# Patient Record
Sex: FEMALE | Race: WHITE | NOT HISPANIC OR LATINO | Employment: UNEMPLOYED | URBAN - METROPOLITAN AREA
[De-identification: names, ages, dates, MRNs, and addresses within clinical notes are randomized per-mention and may not be internally consistent; named-entity substitution may affect disease eponyms.]

---

## 2018-03-22 ENCOUNTER — HOSPITAL ENCOUNTER (EMERGENCY)
Facility: MEDICAL CENTER | Age: 23
End: 2018-03-23
Attending: EMERGENCY MEDICINE
Payer: COMMERCIAL

## 2018-03-22 ENCOUNTER — APPOINTMENT (OUTPATIENT)
Dept: RADIOLOGY | Facility: MEDICAL CENTER | Age: 23
End: 2018-03-22
Attending: EMERGENCY MEDICINE
Payer: COMMERCIAL

## 2018-03-22 VITALS
BODY MASS INDEX: 43.32 KG/M2 | OXYGEN SATURATION: 96 % | DIASTOLIC BLOOD PRESSURE: 81 MMHG | HEIGHT: 63 IN | SYSTOLIC BLOOD PRESSURE: 141 MMHG | HEART RATE: 90 BPM | TEMPERATURE: 98.7 F | RESPIRATION RATE: 18 BRPM | WEIGHT: 244.49 LBS

## 2018-03-22 DIAGNOSIS — N83.209 CYST OF OVARY, UNSPECIFIED LATERALITY: ICD-10-CM

## 2018-03-22 LAB
ALBUMIN SERPL BCP-MCNC: 3.9 G/DL (ref 3.2–4.9)
ALBUMIN/GLOB SERPL: 1 G/DL
ALP SERPL-CCNC: 64 U/L (ref 30–99)
ALT SERPL-CCNC: 27 U/L (ref 2–50)
ANION GAP SERPL CALC-SCNC: 8 MMOL/L (ref 0–11.9)
APPEARANCE UR: CLEAR
AST SERPL-CCNC: 53 U/L (ref 12–45)
BASOPHILS # BLD AUTO: 0.5 % (ref 0–1.8)
BASOPHILS # BLD: 0.06 K/UL (ref 0–0.12)
BILIRUB SERPL-MCNC: 0.3 MG/DL (ref 0.1–1.5)
BUN SERPL-MCNC: 11 MG/DL (ref 8–22)
CALCIUM SERPL-MCNC: 8.7 MG/DL (ref 8.4–10.2)
CHLORIDE SERPL-SCNC: 105 MMOL/L (ref 96–112)
CO2 SERPL-SCNC: 23 MMOL/L (ref 20–33)
COLOR UR: YELLOW
CREAT SERPL-MCNC: 0.65 MG/DL (ref 0.5–1.4)
EOSINOPHIL # BLD AUTO: 0.33 K/UL (ref 0–0.51)
EOSINOPHIL NFR BLD: 2.8 % (ref 0–6.9)
ERYTHROCYTE [DISTWIDTH] IN BLOOD BY AUTOMATED COUNT: 38.9 FL (ref 35.9–50)
GLOBULIN SER CALC-MCNC: 3.8 G/DL (ref 1.9–3.5)
GLUCOSE SERPL-MCNC: 83 MG/DL (ref 65–99)
GLUCOSE UR STRIP.AUTO-MCNC: NEGATIVE MG/DL
HCG UR QL: NEGATIVE
HCT VFR BLD AUTO: 41.6 % (ref 37–47)
HGB BLD-MCNC: 13.8 G/DL (ref 12–16)
IMM GRANULOCYTES # BLD AUTO: 0.04 K/UL (ref 0–0.11)
IMM GRANULOCYTES NFR BLD AUTO: 0.3 % (ref 0–0.9)
KETONES UR STRIP.AUTO-MCNC: NEGATIVE MG/DL
LEUKOCYTE ESTERASE UR QL STRIP.AUTO: NEGATIVE
LIPASE SERPL-CCNC: 17 U/L (ref 7–58)
LYMPHOCYTES # BLD AUTO: 3.8 K/UL (ref 1–4.8)
LYMPHOCYTES NFR BLD: 32 % (ref 22–41)
MCH RBC QN AUTO: 28.1 PG (ref 27–33)
MCHC RBC AUTO-ENTMCNC: 33.2 G/DL (ref 33.6–35)
MCV RBC AUTO: 84.7 FL (ref 81.4–97.8)
MONOCYTES # BLD AUTO: 0.65 K/UL (ref 0–0.85)
MONOCYTES NFR BLD AUTO: 5.5 % (ref 0–13.4)
NEUTROPHILS # BLD AUTO: 7 K/UL (ref 2–7.15)
NEUTROPHILS NFR BLD: 58.9 % (ref 44–72)
NITRITE UR QL STRIP.AUTO: NEGATIVE
NRBC # BLD AUTO: 0 K/UL
NRBC BLD-RTO: 0 /100 WBC
PH UR STRIP.AUTO: 5 [PH]
PLATELET # BLD AUTO: 327 K/UL (ref 164–446)
PMV BLD AUTO: 10.1 FL (ref 9–12.9)
POTASSIUM SERPL-SCNC: 3.6 MMOL/L (ref 3.6–5.5)
PROT SERPL-MCNC: 7.7 G/DL (ref 6–8.2)
PROT UR QL STRIP: ABNORMAL MG/DL
RBC # BLD AUTO: 4.91 M/UL (ref 4.2–5.4)
RBC UR QL AUTO: ABNORMAL
SODIUM SERPL-SCNC: 136 MMOL/L (ref 135–145)
SP GR UR STRIP.AUTO: >=1.03
WBC # BLD AUTO: 11.9 K/UL (ref 4.8–10.8)

## 2018-03-22 PROCEDURE — 81002 URINALYSIS NONAUTO W/O SCOPE: CPT

## 2018-03-22 PROCEDURE — 81025 URINE PREGNANCY TEST: CPT

## 2018-03-22 PROCEDURE — 83690 ASSAY OF LIPASE: CPT

## 2018-03-22 PROCEDURE — 85025 COMPLETE CBC W/AUTO DIFF WBC: CPT

## 2018-03-22 PROCEDURE — 99284 EMERGENCY DEPT VISIT MOD MDM: CPT

## 2018-03-22 PROCEDURE — 80053 COMPREHEN METABOLIC PANEL: CPT

## 2018-03-22 PROCEDURE — 700117 HCHG RX CONTRAST REV CODE 255: Performed by: EMERGENCY MEDICINE

## 2018-03-22 PROCEDURE — 74177 CT ABD & PELVIS W/CONTRAST: CPT

## 2018-03-22 RX ORDER — HYDROCODONE BITARTRATE AND ACETAMINOPHEN 5; 325 MG/1; MG/1
1-2 TABLET ORAL EVERY 4 HOURS PRN
Qty: 10 TAB | Refills: 0 | Status: SHIPPED | OUTPATIENT
Start: 2018-03-22 | End: 2018-03-25

## 2018-03-22 RX ADMIN — IOHEXOL 100 ML: 350 INJECTION, SOLUTION INTRAVENOUS at 22:48

## 2018-03-22 ASSESSMENT — PAIN SCALES - GENERAL: PAINLEVEL_OUTOF10: 6

## 2018-03-23 NOTE — ED NOTES
"Pt approaching triage RN stating \"I have blood in my stool now. There was blood when I wiped.\"   "

## 2018-03-23 NOTE — ED NOTES
Chief Complaint   Patient presents with   • Abdominal Pain     that pain has been there for over a year but the last 2 days she experienced a sharp pain while doing pull downs.

## 2018-03-23 NOTE — ED PROVIDER NOTES
"ED Provider Note    CHIEF COMPLAINT  Chief Complaint   Patient presents with   • Abdominal Pain     that pain has been there for over a year but the last 2 days she experienced a sharp pain while doing pull downs.       HPI  Tenzin Guillen is a 22 y.o. female who presents with complaints of abdominal pain mostly left lower quadrant. It's been there for a year increase the last couple of days she was doing some sort of exercise at the gym. This sharper pain in the left lower quadrant that has persisted although less severe than it was 2 days ago. She had some apparent rectal bleeding tonight although this apparently turned out to be the start of her menstrual period    REVIEW OF SYSTEMS  See HPI for further details. GI she's had some intermittent diarrhea All other systems are negative.    PAST MEDICAL HISTORY  No past medical history on file.    FAMILY HISTORY  No family history on file.    SOCIAL HISTORY  Social History     Social History   • Marital status: Single     Spouse name: N/A   • Number of children: N/A   • Years of education: N/A     Social History Main Topics   • Smoking status: Not on file   • Smokeless tobacco: Not on file   • Alcohol use Not on file   • Drug use: Unknown   • Sexual activity: Not on file     Other Topics Concern   • Not on file     Social History Narrative   • No narrative on file       SURGICAL HISTORY  No past surgical history on file.    CURRENT MEDICATIONS  Home Medications    **Home medications have not yet been reviewed for this encounter**         ALLERGIES  Not on File    PHYSICAL EXAM  VITAL SIGNS: /81   Pulse 81   Temp 37.1 °C (98.7 °F)   Resp 18   Ht 1.6 m (5' 3\")   Wt 110.9 kg (244 lb 7.8 oz)   SpO2 96%   BMI 43.31 kg/m²     Constitutional: Well developed, Well nourished, No acute distress, Non-toxic appearance.   Psychiatric:  Normal psychiatric exam, Normal affect, Normal judgement, Normal mood,   .able to give a good history.  HENT: Normocephalic, " Atraumatic, Bilateral external ears normal, mucous membranes moist, No oral exudates, Nose normal.      Neck: Normal range of motion, No tenderness, Supple, No stridor.   Lymphatic: No cervical or axillary lymphadenopathy noted.   Cardiovascular: Normal heart rate, Normal rhythm, No murmurs,  , No gallops.   Thorax & Lungs: Normal breath sounds, No respiratory distress, No wheezing, or other abnormal breath sounds. No chest tenderness.   Abdomen: Bowel sounds normal, Soft, left lower quadrant mild tenderness, No masses, No pulsatile masses. No guarding.  Rectal: No hemorrhoids seen Hemoccult negative  Skin: Warm, Dry, No erythema, No rash.   Back: No tenderness, No CVA tenderness.   Extremities: Intact distal pulses, No edema, No tenderness, No cyanosis, No clubbing.   Musculoskeletal: Good range of motion in all major joints. No tenderness to palpation or major deformities, or injuries noted.   Neurologic: Alert & oriented x 3, Normal motor function, Normal sensory function, No focal deficits noted.        RADIOLOGY/PROCEDURES  CT-ABDOMEN-PELVIS WITH   Final Result      Pelvic findings as discussed above. Possibilities would include partially ruptured ovarian cyst or tubo-ovarian abscess. No other significant abnormalities.        Results for orders placed or performed during the hospital encounter of 03/22/18   CBC WITH DIFFERENTIAL   Result Value Ref Range    WBC 11.9 (H) 4.8 - 10.8 K/uL    RBC 4.91 4.20 - 5.40 M/uL    Hemoglobin 13.8 12.0 - 16.0 g/dL    Hematocrit 41.6 37.0 - 47.0 %    MCV 84.7 81.4 - 97.8 fL    MCH 28.1 27.0 - 33.0 pg    MCHC 33.2 (L) 33.6 - 35.0 g/dL    RDW 38.9 35.9 - 50.0 fL    Platelet Count 327 164 - 446 K/uL    MPV 10.1 9.0 - 12.9 fL    Neutrophils-Polys 58.90 44.00 - 72.00 %    Lymphocytes 32.00 22.00 - 41.00 %    Monocytes 5.50 0.00 - 13.40 %    Eosinophils 2.80 0.00 - 6.90 %    Basophils 0.50 0.00 - 1.80 %    Immature Granulocytes 0.30 0.00 - 0.90 %    Nucleated RBC 0.00 /100 WBC     Neutrophils (Absolute) 7.00 2.00 - 7.15 K/uL    Lymphs (Absolute) 3.80 1.00 - 4.80 K/uL    Monos (Absolute) 0.65 0.00 - 0.85 K/uL    Eos (Absolute) 0.33 0.00 - 0.51 K/uL    Baso (Absolute) 0.06 0.00 - 0.12 K/uL    Immature Granulocytes (abs) 0.04 0.00 - 0.11 K/uL    NRBC (Absolute) 0.00 K/uL   COMP METABOLIC PANEL   Result Value Ref Range    Sodium 136 135 - 145 mmol/L    Potassium 3.6 3.6 - 5.5 mmol/L    Chloride 105 96 - 112 mmol/L    Co2 23 20 - 33 mmol/L    Anion Gap 8.0 0.0 - 11.9    Glucose 83 65 - 99 mg/dL    Bun 11 8 - 22 mg/dL    Creatinine 0.65 0.50 - 1.40 mg/dL    Calcium 8.7 8.4 - 10.2 mg/dL    AST(SGOT) 53 (H) 12 - 45 U/L    ALT(SGPT) 27 2 - 50 U/L    Alkaline Phosphatase 64 30 - 99 U/L    Total Bilirubin 0.3 0.1 - 1.5 mg/dL    Albumin 3.9 3.2 - 4.9 g/dL    Total Protein 7.7 6.0 - 8.2 g/dL    Globulin 3.8 (H) 1.9 - 3.5 g/dL    A-G Ratio 1.0 g/dL   LIPASE   Result Value Ref Range    Lipase 17 7 - 58 U/L   ESTIMATED GFR   Result Value Ref Range    GFR If African American >60 >60 mL/min/1.73 m 2    GFR If Non African American >60 >60 mL/min/1.73 m 2   POC UA   Result Value Ref Range    POC Color Yellow     POC Appearance Clear     POC Glucose Negative Negative mg/dL    POC Ketones Negative Negative mg/dL    POC Specific Gravity >=1.030 (A) 1.005 - 1.030    POC Blood Large (A) Negative    POC Urine PH 5.0 5.0 - 8.0    POC Protein Trace (A) Negative mg/dL    POC Nitrites Negative Negative    POC Leukocyte Esterase Negative Negative   POC URINE PREGNANCY   Result Value Ref Range    POC Urine Pregnancy Test Negative        COURSE & MEDICAL DECISION MAKING  Pertinent Labs & Imaging studies reviewed. (See chart for details)  Patient has abdominal pain which seems to be secondary to ovarian cyst. She's not pregnant no other definite abnormalities seen, would not seem to require further evaluation this time examination is fairly benign so we referred for follow-up to Ob[facility]yn prescription pain medication for for  couple of days after reviewing the  report. She was counseled about opioids    FINAL IMPRESSION  1 ovarian cyst  2.   3.       Electronically signed by: Alonso Vidales, 3/22/2018 9:18 PM

## 2018-03-23 NOTE — ED NOTES
Rectal exam performed on pt by ERP with female chaperone at the bedside. Privacy provided during procedure. Pt tolerated exam well. Warm blanket provided.

## 2018-03-23 NOTE — DISCHARGE INSTRUCTIONS
Ovarian Cyst  An ovarian cyst is a fluid-filled sac on an ovary. The ovaries are organs that make eggs in women. Most ovarian cysts go away on their own and are not cancerous (are benign). Some cysts need treatment.  Follow these instructions at home:  · Take over-the-counter and prescription medicines only as told by your doctor.  · Do not drive or use heavy machinery while taking prescription pain medicine.  · Get pelvic exams and Pap tests as often as told by your doctor.  · Return to your normal activities as told by your doctor. Ask your doctor what activities are safe for you.  · Do not use any products that contain nicotine or tobacco, such as cigarettes and e-cigarettes. If you need help quitting, ask your doctor.  · Keep all follow-up visits as told by your doctor. This is important.  Contact a doctor if:  · Your periods are:  ¨ Late.  ¨ Irregular.  ¨ Painful.  · Your periods stop.  · You have pelvic pain that does not go away.  · You have pressure on your bladder.  · You have trouble making your bladder empty when you pee (urinate).  · You have pain during sex.  · You have any of the following in your belly (abdomen):  ¨ A feeling of fullness.  ¨ Pressure.  ¨ Discomfort.  ¨ Pain that does not go away.  ¨ Swelling.  · You feel sick most of the time.  · You have trouble pooping (have constipation).  · You are not as hungry as usual (you lose your appetite).  · You get very bad acne.  · You start to have more hair on your body and face.  · You are gaining weight or losing weight without changing your exercise and eating habits.  · You think you may be pregnant.  Get help right away if:  · You have belly pain that is very bad or gets worse.  · You cannot eat or drink without throwing up (vomiting).  · You suddenly get a fever.  · Your period is a lot heavier than usual.  This information is not intended to replace advice given to you by your health care provider. Make sure you discuss any questions you have  with your health care provider.  Document Released: 06/05/2009 Document Revised: 07/07/2017 Document Reviewed: 05/21/2017  [employer] Interactive Patient Education © 2017 [employer]

## 2018-07-09 ENCOUNTER — APPOINTMENT (OUTPATIENT)
Dept: RADIOLOGY | Facility: IMAGING CENTER | Age: 23
End: 2018-07-09
Attending: EMERGENCY MEDICINE
Payer: COMMERCIAL

## 2018-07-09 ENCOUNTER — OFFICE VISIT (OUTPATIENT)
Dept: URGENT CARE | Facility: CLINIC | Age: 23
End: 2018-07-09
Payer: COMMERCIAL

## 2018-07-09 VITALS
HEIGHT: 63 IN | SYSTOLIC BLOOD PRESSURE: 126 MMHG | BODY MASS INDEX: 43.23 KG/M2 | WEIGHT: 244 LBS | DIASTOLIC BLOOD PRESSURE: 86 MMHG | TEMPERATURE: 98.6 F | HEART RATE: 106 BPM

## 2018-07-09 DIAGNOSIS — S99.912A INJURY OF LEFT ANKLE, INITIAL ENCOUNTER: ICD-10-CM

## 2018-07-09 DIAGNOSIS — S93.402A SPRAIN OF LEFT ANKLE, UNSPECIFIED LIGAMENT, INITIAL ENCOUNTER: ICD-10-CM

## 2018-07-09 PROCEDURE — 73610 X-RAY EXAM OF ANKLE: CPT | Mod: 26,LT | Performed by: EMERGENCY MEDICINE

## 2018-07-09 PROCEDURE — 99202 OFFICE O/P NEW SF 15 MIN: CPT | Performed by: EMERGENCY MEDICINE

## 2018-07-09 ASSESSMENT — ENCOUNTER SYMPTOMS
FEVER: 0
TINGLING: 0
INABILITY TO BEAR WEIGHT: 1
SENSORY CHANGE: 0
FOCAL WEAKNESS: 0
LOSS OF SENSATION: 0
NUMBNESS: 0
LOSS OF MOTION: 1

## 2018-07-09 NOTE — LETTER
July 9, 2018       Patient: Tenzin Guillen   YOB: 1995   Date of Visit: 7/9/2018         To Whom It May Concern:    It is my medical opinion that Tenzin Guillen has work restriction 07/09--15/2018: Sitting activity only, must use a walking boot splint, crutches as needed.          Sincerely,          Jackson Funk M.D.  Electronically Signed

## 2018-07-10 NOTE — PROGRESS NOTES
"Subjective:      Tenzin Guillen is a 22 y.o. female who presents with Ankle Injury (Lt ankle swollen and painful. pt was working out and jumpped fell and felt pain x 4 days )            Ankle Injury    Incident onset: 4 days ago. The incident occurred at the gym. The injury mechanism was an inversion injury. The pain is present in the left ankle. Associated symptoms include an inability to bear weight and a loss of motion. Pertinent negatives include no loss of sensation, numbness or tingling. The symptoms are aggravated by palpation, weight bearing and movement. She has tried immobilization and rest for the symptoms. The treatment provided no relief.   Denies additional injury.    Review of Systems   Constitutional: Negative for fever.   Musculoskeletal:        No pain proximal or distal to the site.   Skin: Negative for rash.   Neurological: Negative for tingling, sensory change, focal weakness and numbness.     PMH:  has a past medical history of Asthma and ASTHMA.  MEDS:   Current Outpatient Prescriptions:   •  MethylPREDNISolone Acetate (DEPO-MEDROL INJ), by Injection route., Disp: , Rfl:   •  albuterol (VENTOLIN OR PROVENTIL) 108 (90 BASE) MCG/ACT AERS inhalation aerosol, Inhale 2 Puffs by mouth every 6 hours as needed for Shortness of Breath., Disp: 8.5 g, Rfl: 1  ALLERGIES: No Known Allergies  SURGHX: History reviewed. No pertinent surgical history.  SOCHX:  reports that she has been smoking.  She has a 6.00 pack-year smoking history. She has never used smokeless tobacco. She reports that she drinks alcohol. She reports that she does not use drugs.  FH: family history is not on file.     Objective:     /86   Pulse (!) 106   Temp 37 °C (98.6 °F)   Ht 1.6 m (5' 3\")   Wt 110.7 kg (244 lb)   PF 97 L/min   BMI 43.22 kg/m²      Physical Exam   Constitutional: She appears well-developed and well-nourished. She is cooperative. She does not have a sickly appearance. She does not appear ill. "   Cardiovascular:   Pulses:       Dorsalis pedis pulses are 2+ on the left side.   Distal capillary refill intact.   Musculoskeletal:        Left ankle: She exhibits decreased range of motion, swelling and ecchymosis. She exhibits no deformity. Tenderness. Lateral malleolus, medial malleolus, AITFL and CF ligament tenderness found. No head of 5th metatarsal and no proximal fibula tenderness found. Achilles tendon normal.   No gross ankle joint instability.   Neurological: She is alert. She has normal strength.   Distal sensation to light touch and pressure intact.   Skin: Skin is warm, dry and intact. Ecchymosis noted.   Psychiatric: She has a normal mood and affect.               Assessment/Plan:     1. Sprain of left ankle, unspecified ligament, initial encounter  Elevation, ice, crutches prn  Walking boot splint.  OTC analgesia prn  Referral to sports medicine.    2. Injury of left ankle, initial encounter  - DX-ANKLE 3+ VIEWS LEFT; per radiologist:  Normal ankle series aside from diffuse soft tissue swelling.

## 2018-07-26 ENCOUNTER — OFFICE VISIT (OUTPATIENT)
Dept: URGENT CARE | Facility: CLINIC | Age: 23
End: 2018-07-26
Payer: COMMERCIAL

## 2018-07-26 VITALS
TEMPERATURE: 98.7 F | WEIGHT: 240 LBS | HEIGHT: 63 IN | OXYGEN SATURATION: 97 % | SYSTOLIC BLOOD PRESSURE: 110 MMHG | DIASTOLIC BLOOD PRESSURE: 78 MMHG | BODY MASS INDEX: 42.52 KG/M2 | HEART RATE: 96 BPM

## 2018-07-26 DIAGNOSIS — E66.01 MORBID OBESITY WITH BMI OF 40.0-44.9, ADULT (HCC): ICD-10-CM

## 2018-07-26 DIAGNOSIS — R11.10 CHRONIC VOMITING: ICD-10-CM

## 2018-07-26 PROCEDURE — 99214 OFFICE O/P EST MOD 30 MIN: CPT | Performed by: PHYSICIAN ASSISTANT

## 2018-07-26 RX ORDER — ONDANSETRON 4 MG/1
4 TABLET, FILM COATED ORAL EVERY 4 HOURS PRN
Qty: 30 TAB | Refills: 2 | Status: SHIPPED | OUTPATIENT
Start: 2018-07-26

## 2018-07-26 ASSESSMENT — ENCOUNTER SYMPTOMS
DIAPHORESIS: 0
DIZZINESS: 0
VOMITING: 1
SHORTNESS OF BREATH: 0
NAUSEA: 0
PALPITATIONS: 0
WEIGHT LOSS: 0
BLOOD IN STOOL: 0
FEVER: 0
WEAKNESS: 0
NUMBER OF EPISODES OF EMESIS TODAY: 1
ABDOMINAL PAIN: 0
CONSTIPATION: 0
COUGH: 0
DIARRHEA: 1
CHILLS: 0
HEADACHES: 0

## 2018-07-27 NOTE — PROGRESS NOTES
Subjective:      Tenzin Guillen is a 22 y.o. female who presents with Vomiting (x3 months, nausea, diarrhea, sensitive to smells, weight gain )            Emesis   This is a new problem. Episode onset: daily for 3 months. The problem has been unchanged. Associated symptoms include vomiting. Pertinent negatives include no abdominal pain, chest pain, chills, coughing, diaphoresis, fever, headaches, nausea, rash or weakness. The symptoms are aggravated by eating. She has tried nothing for the symptoms.       Review of Systems   Constitutional: Negative for chills, diaphoresis, fever, malaise/fatigue and weight loss.   Respiratory: Negative for cough and shortness of breath.    Cardiovascular: Negative for chest pain and palpitations.   Gastrointestinal: Positive for diarrhea and vomiting. Negative for abdominal pain, blood in stool, constipation, melena and nausea.   Skin: Negative for itching and rash.   Neurological: Negative for dizziness, weakness and headaches.   All other systems reviewed and are negative.    PMH:  has a past medical history of Asthma and ASTHMA.  MEDS:   Current Outpatient Prescriptions:   •  ondansetron (ZOFRAN) 4 MG Tab tablet, Take 1 Tab by mouth every four hours as needed for Nausea/Vomiting., Disp: 30 Tab, Rfl: 2  •  MethylPREDNISolone Acetate (DEPO-MEDROL INJ), by Injection route., Disp: , Rfl:   •  albuterol (VENTOLIN OR PROVENTIL) 108 (90 BASE) MCG/ACT AERS inhalation aerosol, Inhale 2 Puffs by mouth every 6 hours as needed for Shortness of Breath., Disp: 8.5 g, Rfl: 1  ALLERGIES: No Known Allergies  SURGHX: History reviewed. No pertinent surgical history.  SOCHX:  reports that she has been smoking.  She has a 6.00 pack-year smoking history. She has never used smokeless tobacco. She reports that she uses drugs, including Marijuana, about 7 times per week. She reports that she does not drink alcohol.  FH: Family history was reviewed, no pertinent findings to report  Medications,  "Allergies, and current problem list reviewed today in Epic       Objective:     /78   Pulse 96   Temp 37.1 °C (98.7 °F)   Ht 1.6 m (5' 3\")   Wt 108.9 kg (240 lb)   SpO2 97%   BMI 42.51 kg/m²      Physical Exam   Constitutional: She is oriented to person, place, and time. She appears well-developed and well-nourished. She is active.  Non-toxic appearance. She does not have a sickly appearance. She does not appear ill. No distress.   HENT:   Head: Normocephalic and atraumatic.   Right Ear: External ear normal.   Left Ear: External ear normal.   Nose: Nose normal.   Mouth/Throat: Oropharynx is clear and moist.   Eyes: Conjunctivae, EOM and lids are normal.   Neck: Normal range of motion and full passive range of motion without pain. Neck supple.   Cardiovascular: Normal rate, regular rhythm, S1 normal, S2 normal and normal heart sounds.  Exam reveals no gallop and no friction rub.    No murmur heard.  Pulmonary/Chest: Effort normal and breath sounds normal. No respiratory distress. She has no decreased breath sounds. She has no wheezes. She has no rales. She exhibits no tenderness.   Abdominal: Soft. Normal appearance and bowel sounds are normal. She exhibits no shifting dullness, no distension, no pulsatile liver, no fluid wave, no abdominal bruit, no ascites, no pulsatile midline mass and no mass. There is no tenderness. There is no rigidity, no rebound, no guarding, no CVA tenderness, no tenderness at McBurney's point and negative Morgan's sign. No hernia.   Musculoskeletal: Normal range of motion. She exhibits no edema, tenderness or deformity.   Neurological: She is alert and oriented to person, place, and time.   Skin: Skin is warm, dry and intact. She is not diaphoretic.   Psychiatric: She has a normal mood and affect. Her speech is normal and behavior is normal. Judgment and thought content normal. Cognition and memory are normal.   Vitals reviewed.              Assessment/Plan:   Pt is a 22 yr old " female who states she has been vomiting daily for 3 months.  She states that light, food, smell will trigger her vomiting.  She denies headaches or any chance of pregnancy.  She does smoke marijuana daily.  Vitals normal.  Abdominal exam normal.  Pt declines pregnancy test.  We will refer to GI for chronic symptoms and Rx zofran.  1. Chronic vomiting    - ondansetron (ZOFRAN) 4 MG Tab tablet; Take 1 Tab by mouth every four hours as needed for Nausea/Vomiting.  Dispense: 30 Tab; Refill: 2  - REFERRAL TO GASTROENTEROLOGY    2. Morbid obesity with BMI of 40.0-44.9, adult (HCC)    - Patient identified as having weight management issue.  Appropriate orders and counseling given.    Differential diagnosis, natural history, supportive care discussed. Follow-up with primary care provider within 7-10 days, emergency room precautions discussed.  Patient and/or family appears understanding of information.  Handout and review of patients diagnosis and treatment was discussed extensively.

## 2018-09-20 NOTE — PATIENT INSTRUCTIONS
Vomiting, Adult  Vomiting occurs when stomach contents are thrown up and out of the mouth. Many people notice nausea before vomiting. Vomiting can make you feel weak and dehydrated. Dehydration can make you tired and thirsty, cause you to have a dry mouth, and decrease how often you urinate. Older adults and people who have other diseases or a weak immune system are at higher risk for dehydration. It is important to treat vomiting as told by your health care provider.  Follow these instructions at home:  Follow your health care provider’s instructions about how to care for yourself at home.  Eating and drinking  Follow these recommendations as told by your health care provider:  · Take an oral rehydration solution (ORS). This is a drink that is sold at pharmacies and retail stores.  · Eat bland, easy-to-digest foods in small amounts as you are able. These foods include bananas, applesauce, rice, lean meats, toast, and crackers.  · Drink clear fluids in small amounts as you are able. Clear fluids include water, ice chips, low-calorie sports drinks, and fruit juice that has water added (diluted fruit juice).  · Avoid fluids that contain a lot of sugar or caffeine.  · Avoid alcohol and foods that are spicy or fatty.  General instructions  · Wash your hands frequently with soap and water. If soap and water are not available, use hand . Make sure that everyone in your household washes their hands frequently.  · Take over-the-counter and prescription medicines only as told by your health care provider.  · Watch your condition for any changes.  · Keep all follow-up visits as told by your health care provider. This is important.  Contact a health care provider if:  · You have a fever.  · You are not able to keep fluids down.  · Your vomiting gets worse.  · You have new symptoms.  · You feel light-headed or dizzy.  · You have a headache.  · You have muscle cramps.  Get help right away if:  · You have pain in your  chest, neck, arm, or jaw.  · You feel extremely weak or you faint.  · You have persistent vomiting.  · You have vomit that is bright red or looks like black coffee grounds.  · You have stools that are bloody or black, or stools that look like tar.  · You have severe pain, cramping, or bloating in your abdomen.  · You have a severe headache, a stiff neck, or both.  · You have a rash.  · You have trouble breathing or you are breathing very quickly.  · Your heart is beating very quickly.  · Your skin feels cold and clammy.  · You feel confused.  · You have pain while urinating.  · You have signs of dehydration, such as:  ¨ Dark urine, or very little or no urine.  ¨ Cracked lips.  ¨ Dry mouth.  ¨ Sunken eyes.  ¨ Sleepiness.  ¨ Weakness.  These symptoms may represent a serious problem that is an emergency. Do not wait to see if the symptoms will go away. Get medical help right away. Call your local emergency services (911 in the [place] Do not drive yourself to the hospital.   This information is not intended to replace advice given to you by your health care provider. Make sure you discuss any questions you have with your health care provider.  Document Released: 01/13/2017 Document Revised: 05/25/2017 Document Reviewed: 08/23/2016  [employer] Interactive Patient Education © 2017 [employer]     154.94